# Patient Record
Sex: FEMALE | Race: WHITE
[De-identification: names, ages, dates, MRNs, and addresses within clinical notes are randomized per-mention and may not be internally consistent; named-entity substitution may affect disease eponyms.]

---

## 2018-03-20 ENCOUNTER — HOSPITAL ENCOUNTER (INPATIENT)
Dept: HOSPITAL 17 - NEDDLT | Age: 53
LOS: 7 days | Discharge: HOME HEALTH | DRG: 854 | End: 2018-03-27
Attending: HOSPITALIST | Admitting: HOSPITALIST
Payer: COMMERCIAL

## 2018-03-20 VITALS — BODY MASS INDEX: 41.25 KG/M2 | WEIGHT: 218.48 LBS | HEIGHT: 61 IN

## 2018-03-20 DIAGNOSIS — M00.9: ICD-10-CM

## 2018-03-20 DIAGNOSIS — S61.251A: ICD-10-CM

## 2018-03-20 DIAGNOSIS — S61.452A: ICD-10-CM

## 2018-03-20 DIAGNOSIS — L02.512: ICD-10-CM
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
## 2018-03-20 DIAGNOSIS — Z16.11: ICD-10-CM

## 2018-03-20 DIAGNOSIS — A41.01: Primary | ICD-10-CM

## 2018-03-20 DIAGNOSIS — E11.65: ICD-10-CM

## 2018-03-20 DIAGNOSIS — L08.9: ICD-10-CM

## 2018-03-20 PROCEDURE — 80048 BASIC METABOLIC PNL TOTAL CA: CPT

## 2018-03-20 PROCEDURE — 87070 CULTURE OTHR SPECIMN AEROBIC: CPT

## 2018-03-20 PROCEDURE — 96361 HYDRATE IV INFUSION ADD-ON: CPT

## 2018-03-20 PROCEDURE — 85025 COMPLETE CBC W/AUTO DIFF WBC: CPT

## 2018-03-20 PROCEDURE — 80053 COMPREHEN METABOLIC PANEL: CPT

## 2018-03-20 PROCEDURE — 96375 TX/PRO/DX INJ NEW DRUG ADDON: CPT

## 2018-03-20 PROCEDURE — 93005 ELECTROCARDIOGRAM TRACING: CPT

## 2018-03-20 PROCEDURE — 83036 HEMOGLOBIN GLYCOSYLATED A1C: CPT

## 2018-03-20 PROCEDURE — 82948 REAGENT STRIP/BLOOD GLUCOSE: CPT

## 2018-03-20 PROCEDURE — 87205 SMEAR GRAM STAIN: CPT

## 2018-03-20 PROCEDURE — 87040 BLOOD CULTURE FOR BACTERIA: CPT

## 2018-03-20 PROCEDURE — 90471 IMMUNIZATION ADMIN: CPT

## 2018-03-20 PROCEDURE — 36569 INSJ PICC 5 YR+ W/O IMAGING: CPT

## 2018-03-20 PROCEDURE — 87147 CULTURE TYPE IMMUNOLOGIC: CPT

## 2018-03-20 PROCEDURE — 76937 US GUIDE VASCULAR ACCESS: CPT

## 2018-03-20 PROCEDURE — 83605 ASSAY OF LACTIC ACID: CPT

## 2018-03-20 PROCEDURE — 86403 PARTICLE AGGLUT ANTBDY SCRN: CPT

## 2018-03-20 PROCEDURE — 87206 SMEAR FLUORESCENT/ACID STAI: CPT

## 2018-03-20 PROCEDURE — 87116 MYCOBACTERIA CULTURE: CPT

## 2018-03-20 PROCEDURE — 93971 EXTREMITY STUDY: CPT

## 2018-03-20 PROCEDURE — 73201 CT UPPER EXTREMITY W/DYE: CPT

## 2018-03-20 PROCEDURE — 87186 SC STD MICRODIL/AGAR DIL: CPT

## 2018-03-20 PROCEDURE — 87015 SPECIMEN INFECT AGNT CONCNTJ: CPT

## 2018-03-20 PROCEDURE — 87102 FUNGUS ISOLATION CULTURE: CPT

## 2018-03-20 PROCEDURE — 96365 THER/PROPH/DIAG IV INF INIT: CPT

## 2018-03-20 PROCEDURE — 90714 TD VACC NO PRESV 7 YRS+ IM: CPT

## 2018-03-21 VITALS
HEART RATE: 95 BPM | SYSTOLIC BLOOD PRESSURE: 166 MMHG | OXYGEN SATURATION: 95 % | DIASTOLIC BLOOD PRESSURE: 77 MMHG | TEMPERATURE: 98.8 F | RESPIRATION RATE: 18 BRPM

## 2018-03-21 VITALS
DIASTOLIC BLOOD PRESSURE: 68 MMHG | RESPIRATION RATE: 18 BRPM | OXYGEN SATURATION: 97 % | TEMPERATURE: 100.2 F | HEART RATE: 109 BPM | SYSTOLIC BLOOD PRESSURE: 137 MMHG

## 2018-03-21 VITALS
DIASTOLIC BLOOD PRESSURE: 75 MMHG | SYSTOLIC BLOOD PRESSURE: 131 MMHG | TEMPERATURE: 98.7 F | OXYGEN SATURATION: 96 % | RESPIRATION RATE: 18 BRPM | HEART RATE: 91 BPM

## 2018-03-21 VITALS
SYSTOLIC BLOOD PRESSURE: 132 MMHG | OXYGEN SATURATION: 92 % | RESPIRATION RATE: 18 BRPM | HEART RATE: 92 BPM | TEMPERATURE: 98.3 F | DIASTOLIC BLOOD PRESSURE: 72 MMHG

## 2018-03-21 VITALS
HEART RATE: 87 BPM | RESPIRATION RATE: 20 BRPM | SYSTOLIC BLOOD PRESSURE: 127 MMHG | TEMPERATURE: 98.8 F | OXYGEN SATURATION: 98 % | DIASTOLIC BLOOD PRESSURE: 68 MMHG

## 2018-03-21 PROCEDURE — 0R9V0ZZ DRAINAGE OF LEFT METACARPOPHALANGEAL JOINT, OPEN APPROACH: ICD-10-PCS | Performed by: ORTHOPAEDIC SURGERY

## 2018-03-21 PROCEDURE — 0JBK0ZZ EXCISION OF LEFT HAND SUBCUTANEOUS TISSUE AND FASCIA, OPEN APPROACH: ICD-10-PCS | Performed by: ORTHOPAEDIC SURGERY

## 2018-03-21 RX ADMIN — SODIUM CHLORIDE SCH MLS/HR: 900 INJECTION INTRAVENOUS at 14:17

## 2018-03-21 RX ADMIN — INSULIN ASPART SCH: 100 INJECTION, SOLUTION INTRAVENOUS; SUBCUTANEOUS at 21:00

## 2018-03-21 RX ADMIN — INSULIN ASPART SCH: 100 INJECTION, SOLUTION INTRAVENOUS; SUBCUTANEOUS at 17:08

## 2018-03-21 RX ADMIN — Medication PRN ML: at 15:05

## 2018-03-21 RX ADMIN — MORPHINE SULFATE PRN MG: 2 INJECTION, SOLUTION INTRAMUSCULAR; INTRAVENOUS at 11:01

## 2018-03-21 RX ADMIN — Medication SCH ML: at 21:00

## 2018-03-21 RX ADMIN — Medication PRN ML: at 14:16

## 2018-03-21 RX ADMIN — Medication PRN ML: at 11:01

## 2018-03-21 RX ADMIN — MORPHINE SULFATE PRN MG: 2 INJECTION, SOLUTION INTRAMUSCULAR; INTRAVENOUS at 15:06

## 2018-03-21 RX ADMIN — SODIUM CHLORIDE SCH MLS/HR: 900 INJECTION INTRAVENOUS at 09:43

## 2018-03-21 RX ADMIN — ACYCLOVIR SCH UNITS: 800 TABLET ORAL at 21:00

## 2018-03-21 RX ADMIN — SODIUM CHLORIDE SCH MLS/HR: 900 INJECTION INTRAVENOUS at 03:24

## 2018-03-21 RX ADMIN — SODIUM CHLORIDE SCH MLS/HR: 900 INJECTION INTRAVENOUS at 21:14

## 2018-03-21 RX ADMIN — Medication SCH ML: at 09:43

## 2018-03-21 NOTE — PD.OP
__________________________________________________





Operative Report


Preoperative Diagnosis:  


(1) Dog bite of left hand including fingers with infection


Postoperative Diagnosis:  


(1) septic arthritis metacarpophalangeal joint left index finger


(2) Dog bite of left hand including fingers with infection


Procedure:


incision and drainage left hand abscess


arthrotomy metacarpophalangeal joint left index finger


Anesthesia:


general


Surgeon:


Deshawn Delgadillo


Assistant(s):


kaylene


Operation and Findings:


abscess with involvement of MP joint left index finger











Deshawn Delgadillo MD Mar 21, 2018 21:50

## 2018-03-21 NOTE — MB
cc:

Deshawn Delgadillo MD

****

 

 

DATE:

03/21/2018

 

REASON FOR CONSULTATION:

Left hand infection.

 

HISTORY OF PRESENT ILLNESS:

The patient is a 53-year-old right-hand dominant female who presented 

to the ED with complaints of dog bite to the left hand 3 days ago.  

She has been complaining of worsening pain and swelling over the 

dorsal aspect of the left hand.  The patient was seen at [facility].  

She had opening up of the wound and transferred to [facility].  She 

has been getting IV antibiotics.  Complains of constant throbbing 

pain.  Denies any tingling, numbness.  Complains of mild drainage from

the region.  Denies any other injuries.

 

PAST MEDICAL HISTORY:

Noted.  No significant past medical history.  The patient was found to

have elevated blood sugar of more than 300 last night.

 

PHYSICAL EXAMINATION:

GENERAL:  The patient is alert, oriented x 3.

EXTREMITIES:  Left hand reveals bite marks over the dorsal aspect of 

the hand over the MP joint region of the index finger, another wound 

noted over the radial aspect of the MP joint of the index finger.  

Surrounding swelling and erythema noted.  Tenderness noted over that 

region.  Range of motion of the index finger is limited and painful.  

She has intact distal sensation.  The patient is unable to make a full

fist.  She has intact distal capillary refill.

 

LABORATORY DATA:

Her lab work was reviewed.  She has a white count of 10.4 and shift of

70%.  She had a CT scan of the left upper extremity shows 

evidence of soft tissue swelling of the dorsal aspect of the hand.  No

evidence of drainable collection.

 

ASSESSMENT:

A 53-year-old female with a dog bite and infection to the left hand 
questionable index finger MP joint involvement

 

PLAN:

Will be to take her emergently for incision and drainage of left hand 

infection possible joint drainage.  We will keep the patient n.p.o.  The 
patient is advised to

keep the part elevated.  We will continue with IV antibiotics.

 

 

__________________________________

Deshawn Delgadillo MD SE/MARIAM

D: 03/21/2018, 07:00 PM

T: 03/21/2018, 07:15 PM

Visit #: [id number]

Job #: [id number]

BENNY

## 2018-03-21 NOTE — HHI.HP
__________________________________________________





HPI


Service


[facility]


Primary Care Physician


Adriano Guerrero MD


Admission Diagnosis





Diagnoses:  


Travel History


International Travel<30 Days:  No


Contact w/Intl Traveler <30 Da:  No


Traveled to Known Affected Are:  No


History of Present Illness


53-year-old female with no significant past medical history who presents 

overnight with 3 day history of constant sharp pain in the left and radiating 

to her fingers.  She was bitten by her dog 3 days ago.  She denies any chest 

pain, shortness of breath, lightheadedness, dizziness.





Review of Systems


Except as stated in HPI:  all other systems reviewed are Neg





Past Family Social History


Past Medical History





Patient denies any past medical history


Past Surgical History





Partial hysterectomy


Meniscal surgery on the right knee


Reported Medications


Patient denies taking any home medications


Allergies:  


Coded Allergies:  


     No Known Allergies (Verified  Allergy, Severe, 3/20/18)


Family History





Mother passed away secondary to cervical cancer


Father passed away secondary to MI in his 50s.


Social History


Non-smoker.  Occasional drinker.  Denies illicit drugs.





Physical Exam


Vital Signs





Vital Signs








  Date Time  Temp Pulse Resp B/P (MAP) Pulse Ox O2 Delivery O2 Flow Rate FiO2


 


3/21/18 15:19 98.3 92 18 132/72 (92) 92   


 


3/21/18 11:21 98.8 95 18 166/77 (106) 95   


 


3/21/18 07:09 98.7 91 18 131/75 (93) 96   


 


3/21/18 05:51 98.8 87 20 127/68 (87) 98   


 


3/21/18 01:49 100.2 109 18 137/68 (91) 97   








Physical Exam


GENERAL: This is a well-nourished, well-developed patient, in no apparent 

distress.  Alert and oriented 4.


SKIN: No rashes, ecchymoses or lesions. Cool and dry.


HEAD: Atraumatic. Normocephalic. No temporal or scalp tenderness.


EYES: Pupils equal round and reactive. Extraocular motions intact. No scleral 

icterus. No injection or drainage. 


ENT: Nose without bleeding, purulent drainage or septal hematoma. Throat 

without erythema, tonsillar hypertrophy or exudate. Uvula midline. Airway 

patent.


NECK: Trachea midline. No JVD or lymphadenopathy. Supple, nontender, no 

meningeal signs.


CARDIOVASCULAR: Regular rate and rhythm without murmurs, gallops, or rubs. 


RESPIRATORY: Clear to auscultation. Breath sounds equal bilaterally. No wheezes

, rales, or rhonchi.  


GASTROINTESTINAL: Abdomen soft, non-tender, nondistended. No hepato-splenomegaly

, or palpable masses. No guarding.


MUSCULOSKELETAL: Extremities without clubbing, cyanosis, or edema.  Left hand 

with swelling, induration and erythema on the dorsum with clear drainage from 

puncture lesion posteriorly.  Tender to palpation.  Otherwise no joint 

tenderness, effusion, or edema noted. No calf tenderness. Negative Homans sign 

bilaterally.


NEUROLOGICAL: Awake and alert. Cranial nerves II through XII intact.  Motor and 

sensory grossly within normal limits. Five out of 5 muscle strength in all 

muscle groups.  Normal speech.


Imaging


CT scan from deltoid the ER reviewed.





Caprini VTE Risk Assessment


Caprini VTE Risk Assessment:  No/Low Risk (score <= 1)


Caprini Risk Assessment Model











 Point Value = 1          Point Value = 2  Point Value = 3  Point Value = 5


 


Age 41-60


Minor surgery


BMI > 25 kg/m2


Swollen legs


Varicose veins


Pregnancy or postpartum


History of unexplained or recurrent


   spontaneous 


Oral contraceptives or hormone


   replacement


Sepsis (< 1 month)


Serious lung disease, including


   pneumonia (< 1 month)


Abnormal pulmonary function


Acute myocardial infarction


Congestive heart failure (< 1 month)


History of inflammatory bowel disease


Medical patient at bed rest Age 61-74


Arthroscopic surgery


Major open surgery (> 45 min)


Laparoscopic surgery (> 45 min)


Malignancy


Confined to bed (> 72 hours)


Immobilizing plaster cast


Central venous access Age >= 75


History of VTE


Family history of VTE


Factor V Leiden


Prothrombin [id number]


Lupus anticoagulant


Anticardiolipin antibodies


Elevated serum homocysteine


Heparin-induced thrombocytopenia


Other congenital or acquired


   thrombophilia Stroke (< 1 month)


Elective arthroplasty


Hip, pelvis, or leg fracture


Acute spinal cord injury (< 1 month)








Prophylaxis Regimen











   Total Risk


Factor Score Risk Level Prophylaxis Regimen


 


0-1      Low Early ambulation


 


2 Moderate Order ONE of the following:


*Sequential Compression Device (SCD)


*Heparin 5000 units SQ BID


 


3-4 Higher Order ONE of the following medications:


*Heparin 5000 units SQ TID


*Enoxaparin/Lovenox 40 mg SQ daily (WT < 150 kg, CrCl > 30 mL/min)


*Enoxaparin/Lovenox 30 mg SQ daily (WT < 150 kg, CrCl > 10-29 mL/min)


*Enoxaparin/Lovenox 30 mg SQ BID (WT < 150 kg, CrCl > 30 mL/min)


AND/OR


*Sequential Compression Device (SCD)


 


5 or more Highest Order ONE of the following medications:


*Heparin 5000 units SQ TID (Preferred with Epidurals)


*Enoxaparin/Lovenox 40 mg SQ daily (WT < 150 kg, CrCl > 30 mL/min)


*Enoxaparin/Lovenox 30 mg SQ daily (WT < 150 kg, CrCl > 10-29 mL/min)


*Enoxaparin/Lovenox 30 mg SQ BID (WT < 150 kg, CrCl > 30 mL/min)


AND


*Sequential Compression Device (SCD)











Assessment and Plan


Assessment and Plan





//Dog bite left hand.


//Possible severe sepsis on admission.  Temperature 100.2, tachycardic 109.  

Severe secondary to hyperglycemia


-Blood cultures and wound cultures pending.  CT does not indicate osteomyelitis.


-Continue on Unasyn.


-Hand surgery following.  Appreciate assistance.








//Hyperglycemia.  Blood glucose in 350s, likely secondary to infection.  Check 

A1c.  Start patient on insulin sliding scale.


Discussed Condition With


Patient, nurse





Physician Certification


2 Midnight Certification Type:  Admission for Inpatient Services


Order for Inpatient Services


The services are ordered in accordance with Medicare regulations or non-

Medicare payer requirements, as applicable.  In the case of services not 

specified as inpatient-only, they are appropriately provided as inpatient 

services in accordance with the 2-midnight benchmark.


Estimated LOS (days):  2


 days is the estimated time the patient will need to remain in the hospital, 

assuming treatment plan goals are met and no additional complications.


Post-Hospital Plan:  Not yet determined











Luis Lizama MD Mar 21, 2018 16:11

## 2018-03-21 NOTE — EKG
Date Performed: 03/21/2018       Time Performed: 16:28:50

 

PTAGE:      53 years

 

EKG:      Sinus rhythm 

 

 NORMAL ECG 

 

NO PREVIOUS TRACING            

 

DOCTOR:   Isaak Up  Interpretating Date/Time  03/21/2018 19:20:56

## 2018-03-22 VITALS
OXYGEN SATURATION: 93 % | SYSTOLIC BLOOD PRESSURE: 133 MMHG | RESPIRATION RATE: 18 BRPM | TEMPERATURE: 98.3 F | DIASTOLIC BLOOD PRESSURE: 73 MMHG | HEART RATE: 93 BPM

## 2018-03-22 VITALS
HEART RATE: 94 BPM | OXYGEN SATURATION: 94 % | RESPIRATION RATE: 17 BRPM | TEMPERATURE: 98.4 F | SYSTOLIC BLOOD PRESSURE: 129 MMHG | DIASTOLIC BLOOD PRESSURE: 63 MMHG

## 2018-03-22 VITALS
TEMPERATURE: 98.1 F | RESPIRATION RATE: 19 BRPM | SYSTOLIC BLOOD PRESSURE: 139 MMHG | OXYGEN SATURATION: 94 % | DIASTOLIC BLOOD PRESSURE: 78 MMHG | HEART RATE: 82 BPM

## 2018-03-22 LAB
BASOPHILS # BLD AUTO: 0 TH/MM3 (ref 0–0.2)
BASOPHILS NFR BLD: 0.2 % (ref 0–2)
BUN SERPL-MCNC: 13 MG/DL (ref 7–18)
CALCIUM SERPL-MCNC: 8.9 MG/DL (ref 8.5–10.1)
CHLORIDE SERPL-SCNC: 101 MEQ/L (ref 98–107)
CREAT SERPL-MCNC: 0.76 MG/DL (ref 0.5–1)
EOSINOPHIL # BLD: 0 TH/MM3 (ref 0–0.4)
EOSINOPHIL NFR BLD: 0 % (ref 0–4)
ERYTHROCYTE [DISTWIDTH] IN BLOOD BY AUTOMATED COUNT: 12.3 % (ref 11.6–17.2)
GFR SERPLBLD BASED ON 1.73 SQ M-ARVRAT: 79 ML/MIN (ref 89–?)
GLUCOSE SERPL-MCNC: 349 MG/DL (ref 74–106)
HBA1C MFR BLD: 9.7 % (ref 4.3–6)
HCO3 BLD-SCNC: 25.6 MEQ/L (ref 21–32)
HCT VFR BLD CALC: 38.4 % (ref 35–46)
HGB BLD-MCNC: 13.4 GM/DL (ref 11.6–15.3)
LYMPHOCYTES # BLD AUTO: 0.7 TH/MM3 (ref 1–4.8)
LYMPHOCYTES NFR BLD AUTO: 9 % (ref 9–44)
MCH RBC QN AUTO: 30.5 PG (ref 27–34)
MCHC RBC AUTO-ENTMCNC: 34.7 % (ref 32–36)
MCV RBC AUTO: 87.7 FL (ref 80–100)
MONOCYTE #: 0.2 TH/MM3 (ref 0–0.9)
MONOCYTES NFR BLD: 2.5 % (ref 0–8)
NEUTROPHILS # BLD AUTO: 6.9 TH/MM3 (ref 1.8–7.7)
NEUTROPHILS NFR BLD AUTO: 88.3 % (ref 16–70)
PLATELET # BLD: 195 TH/MM3 (ref 150–450)
PMV BLD AUTO: 8.7 FL (ref 7–11)
RBC # BLD AUTO: 4.38 MIL/MM3 (ref 4–5.3)
SODIUM SERPL-SCNC: 137 MEQ/L (ref 136–145)
WBC # BLD AUTO: 7.8 TH/MM3 (ref 4–11)

## 2018-03-22 RX ADMIN — ACYCLOVIR SCH UNITS: 800 TABLET ORAL at 22:03

## 2018-03-22 RX ADMIN — INSULIN ASPART SCH: 100 INJECTION, SOLUTION INTRAVENOUS; SUBCUTANEOUS at 22:09

## 2018-03-22 RX ADMIN — INSULIN ASPART SCH: 100 INJECTION, SOLUTION INTRAVENOUS; SUBCUTANEOUS at 06:49

## 2018-03-22 RX ADMIN — SODIUM CHLORIDE SCH MLS/HR: 900 INJECTION INTRAVENOUS at 18:36

## 2018-03-22 RX ADMIN — INSULIN ASPART SCH: 100 INJECTION, SOLUTION INTRAVENOUS; SUBCUTANEOUS at 18:17

## 2018-03-22 RX ADMIN — INSULIN ASPART SCH: 100 INJECTION, SOLUTION INTRAVENOUS; SUBCUTANEOUS at 12:00

## 2018-03-22 RX ADMIN — MORPHINE SULFATE PRN MG: 2 INJECTION, SOLUTION INTRAMUSCULAR; INTRAVENOUS at 18:10

## 2018-03-22 RX ADMIN — SODIUM CHLORIDE SCH MLS/HR: 900 INJECTION INTRAVENOUS at 22:01

## 2018-03-22 RX ADMIN — SODIUM CHLORIDE SCH MLS/HR: 900 INJECTION INTRAVENOUS at 03:00

## 2018-03-22 RX ADMIN — SODIUM CHLORIDE SCH MLS/HR: 900 INJECTION INTRAVENOUS at 14:42

## 2018-03-22 RX ADMIN — SODIUM CHLORIDE SCH MLS/HR: 900 INJECTION INTRAVENOUS at 08:55

## 2018-03-22 RX ADMIN — Medication SCH ML: at 09:00

## 2018-03-22 RX ADMIN — Medication SCH ML: at 22:09

## 2018-03-22 RX ADMIN — ACYCLOVIR SCH UNITS: 800 TABLET ORAL at 09:00

## 2018-03-22 NOTE — MP
cc:

Deshawn Delgadillo MD

****

 

 

DATE OF OPERATION:

03/21/2018

 

PREOPERATIVE DIAGNOSIS:

Dog bite with abscess, left hand/index finger.

 

POSTOPERATIVE DIAGNOSIS:

Dog bite with abscess, left hand/index finger and septic arthritis 

metacarpophalangeal joint, left index finger.

 

PROCEDURE PERFORMED:

Incision and drainage of left hand abscess and arthrotomy and drainage

index finger metacarpophalangeal joint, left hand.

 

SURGEON:

Deshawn Delgadillo MD

 

ANESTHESIA:

General.

 

ESTIMATED BLOOD LOSS:

5 mL

 

TOURNIQUET TIME:

17 minutes at 250 mmHg.

 

SPECIMENS:

Sent for culture and sensitivity from the left index finger MP joint. 

 

DISPOSITION:

To PACU stable.

 

INDICATIONS FOR PROCEDURE:

The patient is a 53-year-old female who presented with complaints of 

dog bite to the left hand, 3 days ago.  She complained of pain over 

the left hand and index finger.  On examination, she had pain and 

swelling over the dorsal aspect of the hand, with purulent drainage 

from index finger MP joint region.  She had elevated white count.  

X-ray showed soft tissue shadow.  CT scan showed no evidence of 

collection.  She was consented for incision and drainage of left hand 

abscess.  The patient was explained the risks and benefits of the 

procedure.

 

DETAILS OF PROCEDURE:

The patient was brought to the operating room under general 

anesthesia.  The left upper extremity was sterilely prepped and 

draped.  After limb elevation, tourniquet was inflated to 250 mmHg.  

Incision site was marked over the dorsoulnar aspect of the MP joint 

region of the index finger, corresponding to the bite scotty.  An 

incision was made over the proposed incision site after limb elevation

and tourniquet inflation.  Soft tissue dissection was carried out.  

There was extensive inflammatory tissue on the abscess over the dorsal

aspect of the index finger MP joint region, involving the subcutaneous

location.  On further exploration, there was evidence of purulent 

material draining from the MP joint of the index finger.  Hence, a 

decision was made to proceed with arthrotomy of the index finger MP 

joint.  An incision was made in the longitudinal fashion along the 

ulnar aspect of the MP joint.  On further exploration, there was 

evidence of purulent material within the MP joint.  Material was sent 

for culture and sensitivity.  A piece of capsule was excised and sent 

for culture and sensitivity.  Thorough wash was given using normal 

saline mixed with  irrigant and hydrogen peroxide.  Excisional 

debridement of devitalized tissue was carried out.  The edges of the 

skin was excised, corresponding to the bite.  Another bite scotty, over 

the volar radial aspect of the index finger was explored.  No evidence

of purulent material was noted in the region.  Packing of the MP joint

was carried out.  The skin was then loosely approximated using 4-0 

nylon in a horizontal mattress interrupted fashion.  Tourniquet was 

deflated.  Total tourniquet time was 70 minutes.  The patient had good

distal circulation of at the end of the procedure.  Bulky hand dressing was 
applied, which 

was held in place by bias hand wrap.

 

We will continue with IV antibiotics.  Followup cultures.  We will 

obtain ID recommendation for IV antibiotics and Hand Surgery followup.

 

 

__________________________________

Deshawn Delgadillo MD SE/CHELA

D: 03/21/2018, 09:54 PM

T: 03/21/2018, 10:19 PM

Visit #: [id number]

Job #: [id number]

BENNY

## 2018-03-22 NOTE — HHI.PR
Objective





Vital Signs








  Date Time  Temp Pulse Resp B/P (MAP) Pulse Ox O2 Delivery O2 Flow Rate FiO2


 


3/22/18 12:00 98.1 82 19 139/78 (98) 94   


 


3/22/18 12:00  86 18 118/64 (82) 94 Room Air  


 


3/22/18 08:20   18  95 Room Air  


 


3/22/18 08:00 97.9 72 18 146/70 (95) 95   


 


3/22/18 04:00 98.1 88 16 130/65 (86) 96 Nasal Cannula 3 


 


3/22/18 00:00  138 18 138/67 (90) 95 Nasal Cannula 3 


 


3/21/18 23:00  97 18 140/69 (92) 94 Nasal Cannula 3 


 


3/21/18 22:45  97 18 135/66 (89) 94 Nasal Cannula 3 


 


3/21/18 22:30  100 20 147/72 (97) 94 Nasal Cannula 3 


 


3/21/18 22:15  98 20 145/67 (93) 94 Nasal Cannula 3 


 


3/21/18 22:00  101 22 146/69 (94) 94 Nasal Cannula 3 


 


3/21/18 21:50 98.9 104 20 104/20 (48) 97 Nasal Cannula 3 














I/O      


 


 3/21/18 3/21/18 3/21/18 3/22/18 3/22/18 3/22/18





 06:59 14:59 22:59 06:59 14:59 22:59


 


Intake Total  200 ml 500 ml  600 ml 


 


Output Total   5 ml  800 ml 


 


Balance  200 ml 495 ml  -200 ml 


 


      


 


Intake Oral     600 ml 


 


IV Total  200 ml    


 


Other   500 ml   


 


Output Urine Total     800 ml 


 


Estimated Blood Loss   5 ml   


 


# Voids  2 2   








Result Diagram:  


3/22/18 0533                                                                   

             3/22/18 0533





Objective Remarks


GENERAL: sitting up in bed.  AAOx3


SKIN: Warm and dry.


HEAD: Normocephalic.


EYES: No scleral icterus. No injection or drainage. 


NECK: Supple, trachea midline. No JVD.


CARDIOVASCULAR: Regular rate and rhythm without murmurs, gallops, or rubs. 


RESPIRATORY: Breath sounds equal bilaterally. No accessory muscle use.


GASTROINTESTINAL: Abdomen soft, non-tender, nondistended. 


MUSCULOSKELETAL: No cyanosis, or edema. lef hand dressed, elevated.


BACK: Nontender without obvious deformity. No CVA tenderness.








A/P


Assessment and Plan


//Dog bite left hand.


//Possible severe sepsis on admission.  Temperature 100.2, tachycardic 109.  

Severe secondary to hyperglycemia


-Blood cultures and wound cultures pending.  CT does not indicate osteomyelitis.


-Continue on Unasyn.


-Hand surgery following.  Appreciate assistance.


=3/22 2/p surgical debridement.  GPC WC. start VANC.  consult ID as per hand 

surgery








//Hyperglycemia.  Blood glucose in 350s, likely secondary to infection.  Check 

A1c.  Start patient on insulin sliding scale.


Discharge Planning


cont IV ABX for hand infection











Luis Lizama MD Mar 22, 2018 17:00

## 2018-03-22 NOTE — HHI.PR
Subjective


Remarks


complains of mild pain


Lot better compared to prior to surgery


complaint with limb elevation


denies any numbness


no fever





Objective





Vital Signs








  Date Time  Temp Pulse Resp B/P (MAP) Pulse Ox O2 Delivery O2 Flow Rate FiO2


 


3/22/18 12:00  86 18 118/64 (82) 94 Room Air  


 


3/22/18 08:20   18  95 Room Air  


 


3/22/18 08:00 97.9 72 18 146/70 (95) 95   


 


3/22/18 04:00 98.1 88 16 130/65 (86) 96 Nasal Cannula 3 


 


3/22/18 00:00  138 18 138/67 (90) 95 Nasal Cannula 3 


 


3/21/18 23:00  97 18 140/69 (92) 94 Nasal Cannula 3 


 


3/21/18 22:45  97 18 135/66 (89) 94 Nasal Cannula 3 


 


3/21/18 22:30  100 20 147/72 (97) 94 Nasal Cannula 3 


 


3/21/18 22:15  98 20 145/67 (93) 94 Nasal Cannula 3 


 


3/21/18 22:00  101 22 146/69 (94) 94 Nasal Cannula 3 


 


3/21/18 21:50 98.9 104 20 104/20 (48) 97 Nasal Cannula 3 


 


3/21/18 15:19 98.3 92 18 132/72 (92) 92   














I/O      


 


 3/21/18 3/21/18 3/21/18 3/22/18 3/22/18 3/22/18





 07:00 15:00 23:00 07:00 15:00 23:00


 


Intake Total  200 ml 500 ml  600 ml 


 


Output Total   5 ml  800 ml 


 


Balance  200 ml 495 ml  -200 ml 


 


      


 


Intake Oral     600 ml 


 


IV Total  200 ml    


 


Other   500 ml   


 


Output Urine Total     800 ml 


 


Estimated Blood Loss   5 ml   


 


# Voids  2 2   














left hand:


 dressing and packing in place


decreased swelling


no drainage


Index finger MP joint range of motion is painful and limited


intact sensation distally





white count normalized


Result Diagram:  


3/22/18 0533                                                                   

             3/22/18 0533








Assessment and Plan


Assessment and Plan


53 year old female s/p I and D, arthrotomy Index finger MP joint POD 1





Plan:





packing pulled out a cm


dry dressing applied


continue with limb elevation and range of motion exercises


continue IV antibiotics


ID consultation for septic arthritis antibiotics.


hand surgery will follow.











Deshawn Delgadillo MD Mar 22, 2018 15:02

## 2018-03-23 VITALS
HEART RATE: 77 BPM | RESPIRATION RATE: 18 BRPM | DIASTOLIC BLOOD PRESSURE: 62 MMHG | OXYGEN SATURATION: 95 % | SYSTOLIC BLOOD PRESSURE: 142 MMHG | TEMPERATURE: 98.1 F

## 2018-03-23 VITALS
RESPIRATION RATE: 18 BRPM | TEMPERATURE: 98.4 F | OXYGEN SATURATION: 95 % | HEART RATE: 76 BPM | SYSTOLIC BLOOD PRESSURE: 129 MMHG | DIASTOLIC BLOOD PRESSURE: 66 MMHG

## 2018-03-23 VITALS
TEMPERATURE: 98.8 F | HEART RATE: 82 BPM | RESPIRATION RATE: 18 BRPM | DIASTOLIC BLOOD PRESSURE: 72 MMHG | OXYGEN SATURATION: 95 % | SYSTOLIC BLOOD PRESSURE: 141 MMHG

## 2018-03-23 VITALS
SYSTOLIC BLOOD PRESSURE: 150 MMHG | TEMPERATURE: 98.1 F | RESPIRATION RATE: 17 BRPM | DIASTOLIC BLOOD PRESSURE: 62 MMHG | HEART RATE: 77 BPM | OXYGEN SATURATION: 97 %

## 2018-03-23 VITALS
RESPIRATION RATE: 18 BRPM | SYSTOLIC BLOOD PRESSURE: 146 MMHG | HEART RATE: 84 BPM | TEMPERATURE: 99 F | DIASTOLIC BLOOD PRESSURE: 84 MMHG | OXYGEN SATURATION: 96 %

## 2018-03-23 VITALS
OXYGEN SATURATION: 95 % | HEART RATE: 84 BPM | DIASTOLIC BLOOD PRESSURE: 58 MMHG | SYSTOLIC BLOOD PRESSURE: 123 MMHG | TEMPERATURE: 98.3 F | RESPIRATION RATE: 17 BRPM

## 2018-03-23 RX ADMIN — SODIUM CHLORIDE SCH MLS/HR: 900 INJECTION INTRAVENOUS at 19:56

## 2018-03-23 RX ADMIN — SODIUM CHLORIDE SCH MLS/HR: 900 INJECTION INTRAVENOUS at 05:42

## 2018-03-23 RX ADMIN — Medication SCH ML: at 19:57

## 2018-03-23 RX ADMIN — SODIUM CHLORIDE SCH MLS/HR: 900 INJECTION INTRAVENOUS at 09:23

## 2018-03-23 RX ADMIN — INSULIN ASPART SCH: 100 INJECTION, SOLUTION INTRAVENOUS; SUBCUTANEOUS at 18:04

## 2018-03-23 RX ADMIN — SODIUM CHLORIDE SCH MLS/HR: 900 INJECTION INTRAVENOUS at 03:57

## 2018-03-23 RX ADMIN — INSULIN ASPART SCH: 100 INJECTION, SOLUTION INTRAVENOUS; SUBCUTANEOUS at 09:25

## 2018-03-23 RX ADMIN — INSULIN ASPART SCH: 100 INJECTION, SOLUTION INTRAVENOUS; SUBCUTANEOUS at 12:00

## 2018-03-23 RX ADMIN — ACYCLOVIR SCH UNITS: 800 TABLET ORAL at 20:53

## 2018-03-23 RX ADMIN — MORPHINE SULFATE PRN MG: 2 INJECTION, SOLUTION INTRAMUSCULAR; INTRAVENOUS at 19:57

## 2018-03-23 RX ADMIN — INSULIN ASPART SCH: 100 INJECTION, SOLUTION INTRAVENOUS; SUBCUTANEOUS at 20:53

## 2018-03-23 RX ADMIN — ACYCLOVIR SCH UNITS: 800 TABLET ORAL at 09:28

## 2018-03-23 RX ADMIN — SODIUM CHLORIDE SCH MLS/HR: 900 INJECTION INTRAVENOUS at 17:50

## 2018-03-23 RX ADMIN — Medication SCH ML: at 09:00

## 2018-03-23 NOTE — PD.ID.CON
History of Present Illness


Service


ID


Consult Requested By


Dr Lizama


Reason for Consult


L hand dog bite


Primary Care Physician


Adriano Guerrero MD


Diagnoses:  


History of Present Illness


52 yo female presented 2 days post her dog has bitten her into L hand


She presented to [place] [facility] with worsening swelling redness and pain and was 

seen by Dr ESCOBAR


She was taken to OR and had Incision and drainage of left hand abscess and 

arthrotomy and drainage index finger metacarpophalangeal joint, left hand yday


Clx is growing MSSA


Pain, swerlling improved 


On presentation fever, chills, temp up to 100.2, no leukocytosis


Today fever resolved


 Also newly diagnosed DM on this admission


Dog is the pt's own dog, 8 y.o. yorkie, up to date on shots





Review of Systems


Except as stated in HPI:  all other systems reviewed are Neg





Past Family Social History


Allergies:  


Coded Allergies:  


     No Known Allergies (Verified  Allergy, Severe, 3/20/18)


Past Medical History


none PTA


Past Surgical History


Partial hysterectomy


Meniscal surgery on the right knee


Active Ordered Medications


Medications where reviewed in EMR: zosyn, vanco


Family History





Mother passed away secondary to cervical cancer


Father passed away secondary to MI in his 50s.


Social History


 


Non-smoker.  Occasional drinker.  Denies illicit drugs.





Physical Exam


Vital Signs





Vital Signs








  Date Time  Temp Pulse Resp B/P (MAP) Pulse Ox O2 Delivery O2 Flow Rate FiO2


 


3/23/18 16:00 99.0 84 18 146/84 (104) 96   


 


3/23/18 12:00 98.4 76 18 129/66 (87) 95   


 


3/23/18 08:00 98.1 77 18 142/62 (88) 95   


 


3/23/18 04:47 98.1 77 17 150/62 (91) 97   


 


3/23/18 00:30 98.3 84 17 123/58 (79) 95   


 


3/22/18 20:11 98.4 94 17 129/63 (85) 94   








Physical Exam


CONSTITUTIONAL/GENERAL: This is an adequately nourished patient, in no apparent 

distress.


TUBES/LINES/DRAINS:


SKIN: No jaundice, rashes, or lesions.


Skin temperature appropriate. Not diaphoretic. 


HEAD: Atraumatic. Normocephalic.


EYES: Pupils equal and round and reactive. Extraocular motions intact. No 

scleral icterus. No injection or drainage. Fundi not examined.


ENT: Hearing grossly normal. Nose without bleeding or purulent drainage. Throat 

without visible erythema, exudates, masses, or lesions.


NECK: Trachea midline. Supple, nontender. No palpable thyroid enlargement or 

nodularity. 


CARDIOVASCULAR: Regular rate and rhythm without murmurs, gallops, or rubs. No 

JVD. Peripheral pulses symmetric.


RESPIRATORY/CHEST: Symmetric, unlabored respirations. Clear to auscultation. 

Breath sounds equal bilaterally. No wheezes, rales, or rhonchi.  


GASTROINTESTINAL: Abdomen soft, non-tender, nondistended. No hepato-splenomegaly

, or palpable masses. No guarding. Bowel sounds present.


 MUSCULOSKELETAL: Extremities without clubbing, cyanosis, or edema. No joint 

tenderness or effusion noted. No calf tenderness. No mottling or clubbing.


STATUS LOCALIS:


L hand with residual  small amount of barely noticible erythema  + wrinkling cw 

diminishing edema, erythema


Incision over 1 St MCP joint, packed with small amount of purulence upon 

packing removal


minimal surraondinfg erythema, mils edema 


LYMPHATICS: No palpable epithroclear and axillae adenopathy.


NEUROLOGICAL: Awake and alert. Motor and sensory grossly within normal limits. 

Follows commands. Cognitively sharp. Moves all extremities.


PSYCHIATRIC: No obvious anxiety/depression. no apparent hallucinations or other 

psychotic thought process.


Laboratory











 Date/Time


Source Procedure


Growth Status


 


 


 3/22/18 00:04


Wound Finger Fungal Smear - Final


NO FUNGAL ELEMENTS SEEN. Resulted


 


 3/22/18 00:04


Wound Finger Fungal Culture


Pending Resulted








Result Diagram:  


3/22/18 0533                                                                   

             3/22/18 0533








Assessment and Plan


Assessment and Plan


Sp dog bite with abscess, left hand/index finger and septic arthritis 

metacarpophalangeal joint, left index finger, MSSA


 sp I+D





cont Unasyn for now 


fu cl untill final


Discussed Condition With


Dr Delgadillo @ b/s











Faiza Gómez MD Mar 23, 2018 17:57

## 2018-03-23 NOTE — HHI.PR
Subjective


Remarks


complains of mild pain


complaint with limb elevation


denies any numbness


no fever





Objective





Vital Signs








  Date Time  Temp Pulse Resp B/P (MAP) Pulse Ox O2 Delivery O2 Flow Rate FiO2


 


3/23/18 16:00 99.0 84 18 146/84 (104) 96   


 


3/23/18 12:00 98.4 76 18 129/66 (87) 95   


 


3/23/18 08:00 98.1 77 18 142/62 (88) 95   


 


3/23/18 04:47 98.1 77 17 150/62 (91) 97   


 


3/23/18 00:30 98.3 84 17 123/58 (79) 95   


 


3/22/18 20:11 98.4 94 17 129/63 (85) 94   














I/O      


 


 3/22/18 3/22/18 3/22/18 3/23/18 3/23/18 3/23/18





 07:00 15:00 23:00 07:00 15:00 23:00


 


Intake Total  600 ml  932 ml  


 


Output Total  800 ml    


 


Balance  -200 ml  932 ml  


 


      


 


Intake Oral  600 ml  480 ml  


 


IV Total    452 ml  


 


Output Urine Total  800 ml    


 


# Voids    3  











examination of the left hand;





dressing and packing in place


mild swelling and erythema noted


minimal drainage


range of motion of the index finger is limited and painful


intact sensation distally





cultures: staph


Result Diagram:  


3/22/18 0533                                                                   

             3/22/18 0533








Assessment and Plan


Assessment and Plan


53 year old female s/p I and D, arthrotomy Index finger MP joint POD 2





Plan:





packing pulled out.


dry dressing applied


continue with limb elevation and range of motion exercises


continue IV antibiotics


ID consultation for septic arthritis antibiotics.


hand surgery will follow.











Deshawn Delgadillo MD Mar 23, 2018 19:14

## 2018-03-24 VITALS
TEMPERATURE: 98.8 F | DIASTOLIC BLOOD PRESSURE: 74 MMHG | RESPIRATION RATE: 17 BRPM | SYSTOLIC BLOOD PRESSURE: 164 MMHG | HEART RATE: 85 BPM | OXYGEN SATURATION: 95 %

## 2018-03-24 VITALS
RESPIRATION RATE: 18 BRPM | TEMPERATURE: 98.2 F | HEART RATE: 75 BPM | SYSTOLIC BLOOD PRESSURE: 142 MMHG | DIASTOLIC BLOOD PRESSURE: 72 MMHG | OXYGEN SATURATION: 93 %

## 2018-03-24 VITALS
DIASTOLIC BLOOD PRESSURE: 78 MMHG | HEART RATE: 76 BPM | TEMPERATURE: 98.5 F | SYSTOLIC BLOOD PRESSURE: 143 MMHG | OXYGEN SATURATION: 93 % | RESPIRATION RATE: 18 BRPM

## 2018-03-24 VITALS
OXYGEN SATURATION: 97 % | TEMPERATURE: 98.2 F | SYSTOLIC BLOOD PRESSURE: 148 MMHG | HEART RATE: 74 BPM | RESPIRATION RATE: 18 BRPM | DIASTOLIC BLOOD PRESSURE: 80 MMHG

## 2018-03-24 VITALS
OXYGEN SATURATION: 93 % | SYSTOLIC BLOOD PRESSURE: 140 MMHG | RESPIRATION RATE: 18 BRPM | DIASTOLIC BLOOD PRESSURE: 65 MMHG | HEART RATE: 84 BPM | TEMPERATURE: 99.2 F

## 2018-03-24 VITALS
SYSTOLIC BLOOD PRESSURE: 141 MMHG | DIASTOLIC BLOOD PRESSURE: 70 MMHG | OXYGEN SATURATION: 97 % | HEART RATE: 75 BPM | RESPIRATION RATE: 18 BRPM | TEMPERATURE: 98.5 F

## 2018-03-24 RX ADMIN — SODIUM CHLORIDE SCH MLS/HR: 900 INJECTION INTRAVENOUS at 15:38

## 2018-03-24 RX ADMIN — INSULIN ASPART SCH: 100 INJECTION, SOLUTION INTRAVENOUS; SUBCUTANEOUS at 13:06

## 2018-03-24 RX ADMIN — INSULIN ASPART SCH: 100 INJECTION, SOLUTION INTRAVENOUS; SUBCUTANEOUS at 17:55

## 2018-03-24 RX ADMIN — INSULIN ASPART SCH: 100 INJECTION, SOLUTION INTRAVENOUS; SUBCUTANEOUS at 09:26

## 2018-03-24 RX ADMIN — SODIUM CHLORIDE SCH MLS/HR: 900 INJECTION INTRAVENOUS at 09:27

## 2018-03-24 RX ADMIN — INSULIN ASPART SCH: 100 INJECTION, SOLUTION INTRAVENOUS; SUBCUTANEOUS at 21:00

## 2018-03-24 RX ADMIN — SODIUM CHLORIDE SCH MLS/HR: 900 INJECTION INTRAVENOUS at 22:27

## 2018-03-24 RX ADMIN — Medication SCH ML: at 22:27

## 2018-03-24 RX ADMIN — ACYCLOVIR SCH UNITS: 800 TABLET ORAL at 09:26

## 2018-03-24 RX ADMIN — Medication SCH ML: at 09:28

## 2018-03-24 RX ADMIN — ACYCLOVIR SCH UNITS: 800 TABLET ORAL at 21:00

## 2018-03-24 RX ADMIN — SODIUM CHLORIDE SCH MLS/HR: 900 INJECTION INTRAVENOUS at 03:28

## 2018-03-24 NOTE — HHI.PR
Subjective


Remarks


complains of mild pain


complains of stiffness


complaint with limb elevation


denies any numbness


no fever





Objective





Vital Signs








  Date Time  Temp Pulse Resp B/P (MAP) Pulse Ox O2 Delivery O2 Flow Rate FiO2


 


3/24/18 16:00 98.2 74 18 148/80 (102) 97   


 


3/24/18 12:00 98.5 76 18 143/78 (99) 93   


 


3/24/18 08:00 98.2 75 18 142/72 (95) 93   


 


3/24/18 04:00 98.5 75 18 141/70 (93) 97   


 


3/24/18 00:00 99.2 84 18 140/65 (90) 93   


 


3/23/18 21:30 98.8 82 18 141/72 (95) 95   














I/O      


 


 3/23/18 3/23/18 3/23/18 3/24/18 3/24/18 3/24/18





 07:00 15:00 23:00 07:00 15:00 23:00


 


Intake Total 932 ml  340 ml 100 ml  


 


Balance 932 ml  340 ml 100 ml  


 


      


 


Intake Oral 480 ml  240 ml   


 


IV Total 452 ml  100 ml 100 ml  


 


# Voids 3  2 1  


 


# Bowel Movements   1 0  











examination of the left hand:





dressing in place


decreased swelling and erythema


minimal drainage


painful and limited range of motion of the index finger at the MP joint





cultures: MSSA


Result Diagram:  


3/22/18 0533                                                                   

             3/22/18 0533








Assessment and Plan


Assessment and Plan


53 year old female s/p I and D, arthrotomy Index finger MP joint POD 3





Plan:








dry dressing applied


continue with limb elevation and range of motion exercises


continue IV antibiotics based on ID recommendations


hand surgery will follow.











Deshawn Delgadillo MD Mar 24, 2018 18:16

## 2018-03-24 NOTE — HHI.PR
Subjective


Remarks


Follow up for dog bite wound, septic arthritis.  Patient is currently doing 

well.  No fever or chills.  She is able to move her fingers more.  Still has 

some pain on movement of her fingers.





Objective


Vitals





Vital Signs








  Date Time  Temp Pulse Resp B/P (MAP) Pulse Ox O2 Delivery O2 Flow Rate FiO2


 


3/24/18 12:00 98.5 76 18 143/78 (99) 93   


 


3/24/18 08:00 98.2 75 18 142/72 (95) 93   


 


3/24/18 04:00 98.5 75 18 141/70 (93) 97   


 


3/24/18 00:00 99.2 84 18 140/65 (90) 93   


 


3/23/18 21:30 98.8 82 18 141/72 (95) 95   














I/O      


 


 3/23/18 3/23/18 3/23/18 3/24/18 3/24/18 3/24/18





 07:00 15:00 23:00 07:00 15:00 23:00


 


Intake Total 932 ml  340 ml 100 ml  


 


Balance 932 ml  340 ml 100 ml  


 


      


 


Intake Oral 480 ml  240 ml   


 


IV Total 452 ml  100 ml 100 ml  


 


# Voids 3  2 1  


 


# Bowel Movements   1 0  








Result Diagram:  


3/22/18 0533                                                                   

             3/22/18 0533





Objective Remarks


GENERAL: AOX3, NAD. 


SKIN: Warm and dry.


HEAD: Normocephalic.


EYES: No scleral icterus. No injection or drainage. 


NECK: Supple, trachea midline. No JVD or lymphadenopathy.


CARDIOVASCULAR: Regular rate and rhythm without murmurs, gallops, or rubs. 


RESPIRATORY: Breath sounds equal bilaterally. No accessory muscle use.


GASTROINTESTINAL: Abdomen soft, non-tender, nondistended. 


MUSCULOSKELETAL: No cyanosis, or edema. Left arm tied to pole to keep elevated. 

Able to move fingers. 


BACK: Nontender without obvious deformity. No CVA tenderness.





Procedures


3/21/2018


incision and drainage left hand abscess


arthrotomy metacarpophalangeal joint left index finger





A/P


Problem List:  


(1) Dog bite of left hand including fingers with infection


ICD Code:  S61.452A - Open bite of left hand, initial encounter; S61.259A - 

Open bite of unspecified finger without damage to nail, initial encounter; 

L08.9 - Local infection of the skin and subcutaneous tissue, unspecified; 

W54.0XXA - Bitten by dog, initial encounter


(2) Diabetes mellitus


ICD Code:  E11.9 - Type 2 diabetes mellitus without complications


Assessment and Plan


Ms. Woo is a 53 year old female who was admitted due to left hand pain, 

swelling following a dog bite. Her dog is vaccinated. 





- Dog bite wound of left hand


- Left hand abscess


   - Wound cx grew MSSA resistant to PCN. Will d/c Vancomycin. 


   - Currently on Unasyn. We can likely discharge patient on Augmentin or 

consider Moxifloxacin monotherapy. 


   - Infectious disease evaluated patient and continued Unasyn





- Diabetes mellitus


   - HbA1c 9.7. Currently on Levemir 5 units Q12, Sliding scale insulin. 


   - Will initiate Metformin and Glimepiride upon discharge. Follow up HbA1C in 

3 month. 


   - Will also refer patient to [facility]. 





Full code. SCDs, ambulation.











Rosetta Gardner DO Mar 24, 2018 4:25 pm

## 2018-03-25 VITALS
TEMPERATURE: 97.6 F | OXYGEN SATURATION: 96 % | DIASTOLIC BLOOD PRESSURE: 67 MMHG | HEART RATE: 88 BPM | SYSTOLIC BLOOD PRESSURE: 150 MMHG | RESPIRATION RATE: 19 BRPM

## 2018-03-25 VITALS
OXYGEN SATURATION: 96 % | RESPIRATION RATE: 18 BRPM | SYSTOLIC BLOOD PRESSURE: 136 MMHG | TEMPERATURE: 97.9 F | HEART RATE: 84 BPM | DIASTOLIC BLOOD PRESSURE: 73 MMHG

## 2018-03-25 VITALS
OXYGEN SATURATION: 96 % | TEMPERATURE: 98.5 F | DIASTOLIC BLOOD PRESSURE: 86 MMHG | SYSTOLIC BLOOD PRESSURE: 111 MMHG | HEART RATE: 107 BPM | RESPIRATION RATE: 18 BRPM

## 2018-03-25 VITALS
RESPIRATION RATE: 18 BRPM | TEMPERATURE: 98 F | DIASTOLIC BLOOD PRESSURE: 60 MMHG | HEART RATE: 72 BPM | OXYGEN SATURATION: 95 % | SYSTOLIC BLOOD PRESSURE: 127 MMHG

## 2018-03-25 VITALS
OXYGEN SATURATION: 94 % | DIASTOLIC BLOOD PRESSURE: 79 MMHG | RESPIRATION RATE: 17 BRPM | TEMPERATURE: 98 F | SYSTOLIC BLOOD PRESSURE: 142 MMHG | HEART RATE: 73 BPM

## 2018-03-25 RX ADMIN — MORPHINE SULFATE PRN MG: 2 INJECTION, SOLUTION INTRAMUSCULAR; INTRAVENOUS at 21:20

## 2018-03-25 RX ADMIN — INSULIN ASPART SCH: 100 INJECTION, SOLUTION INTRAVENOUS; SUBCUTANEOUS at 12:32

## 2018-03-25 RX ADMIN — SODIUM CHLORIDE SCH MLS/HR: 900 INJECTION INTRAVENOUS at 09:16

## 2018-03-25 RX ADMIN — ACYCLOVIR SCH UNITS: 800 TABLET ORAL at 21:41

## 2018-03-25 RX ADMIN — INSULIN ASPART SCH: 100 INJECTION, SOLUTION INTRAVENOUS; SUBCUTANEOUS at 17:27

## 2018-03-25 RX ADMIN — INSULIN ASPART SCH: 100 INJECTION, SOLUTION INTRAVENOUS; SUBCUTANEOUS at 08:00

## 2018-03-25 RX ADMIN — Medication SCH ML: at 21:16

## 2018-03-25 RX ADMIN — SODIUM CHLORIDE SCH MLS/HR: 900 INJECTION INTRAVENOUS at 03:45

## 2018-03-25 RX ADMIN — SODIUM CHLORIDE SCH MLS/HR: 900 INJECTION INTRAVENOUS at 14:39

## 2018-03-25 RX ADMIN — SODIUM CHLORIDE SCH MLS/HR: 900 INJECTION INTRAVENOUS at 21:16

## 2018-03-25 RX ADMIN — INSULIN ASPART SCH: 100 INJECTION, SOLUTION INTRAVENOUS; SUBCUTANEOUS at 21:42

## 2018-03-25 RX ADMIN — ACYCLOVIR SCH UNITS: 800 TABLET ORAL at 09:18

## 2018-03-25 RX ADMIN — Medication SCH ML: at 09:17

## 2018-03-25 NOTE — HHI.PR
Subjective


Remarks


complains of mild pain


complains of stiffness


complaint with limb elevation


denies any numbness


no fever





Objective





Vital Signs








  Date Time  Temp Pulse Resp B/P (MAP) Pulse Ox O2 Delivery O2 Flow Rate FiO2


 


3/25/18 08:00 98.0 72 18 127/60 (82) 95   


 


3/25/18 03:15 98.0 73 17 142/79 (100) 94   


 


3/25/18 00:00 97.6 88 19 150/67 (94) 96   


 


3/24/18 20:15 98.8 85 17 164/74 (104) 95   


 


3/24/18 16:00 98.2 74 18 148/80 (102) 97   














I/O      


 


 3/24/18 3/24/18 3/24/18 3/25/18 3/25/18 3/25/18





 07:00 15:00 23:00 07:00 15:00 23:00


 


Intake Total 100 ml  900 ml 1000 ml  


 


Balance 100 ml  900 ml 1000 ml  


 


      


 


Intake Oral   900 ml 1000 ml  


 


IV Total 100 ml     


 


# Voids 1  2 3  


 


# Bowel Movements 0  0 0  











examination of left hand:





dressing in place


mild swelling and redness noted over the MP joint region


minimal drainage


terminal degrees of flexion and extension of the MP joint is limited and painful





cultures: MSSA


Result Diagram:  


3/22/18 0533                                                                   

             3/22/18 0533








Assessment and Plan


Assessment and Plan


53 year old female s/p I and D, arthrotomy Index finger MP joint POD 4





Plan:





suture removed, wound opened up and washed with normal saline


dry dressing applied


continue with limb elevation and range of motion exercises


continue IV antibiotics based on ID recommendations


hand surgery will follow.











Deshawn Delgadillo MD Mar 25, 2018 12:13

## 2018-03-25 NOTE — RADRPT
EXAM DATE/TIME:  03/25/2018 22:39 

 

[facility] COMPARISON:     

No previous studies available for comparison.

        

 

 

INDICATIONS :                

Right arm swelling. 

            

 

MEDICAL HISTORY :        

Headache. Migraine. Arthritis. Diabetes. 

 

SURGICAL HISTORY :     

Hysterectomy.    Left meniscus repair. 

 

ENCOUNTER:     

Initial

 

ACUITY:     

2 day

 

PAIN SCORE:      

4/10

 

LOCATION:      

Right  arm. 

                       

 

FINDINGS:     

There is spontaneous flow documented in the brachial, basilic, cephalic, axillary, and subclavian vei
ns.  The vessels are compressible and augmentation response is documented.  No filling defects are se
en.  The flow is phasic with respiration.  Direction of flow in the jugular vein is caudal.  

 

CONCLUSION:     

No DVT right arm.

 

 

 

 Shaan Zafar MD on March 25, 2018 at 23:16           

Board Certified Radiologist.

 This report was verified electronically.

## 2018-03-25 NOTE — HHI.PR
Subjective


Remarks


Follow up for dog bite wound, septic arthritis. Patient is currently doing 

well. Able to move her left fingers but has some pain. No fever, chills.





Objective


Vitals





Vital Signs








  Date Time  Temp Pulse Resp B/P (MAP) Pulse Ox O2 Delivery O2 Flow Rate FiO2


 


3/25/18 16:00 98.5 107 18 111/86 (94) 96   


 


3/25/18 08:00 98.0 72 18 127/60 (82) 95   


 


3/25/18 03:15 98.0 73 17 142/79 (100) 94   


 


3/25/18 00:00 97.6 88 19 150/67 (94) 96   














I/O      


 


 3/24/18 3/24/18 3/24/18 3/25/18 3/25/18 3/25/18





 07:00 15:00 23:00 07:00 15:00 23:00


 


Intake Total 100 ml  900 ml 1000 ml  


 


Balance 100 ml  900 ml 1000 ml  


 


      


 


Intake Oral   900 ml 1000 ml  


 


IV Total 100 ml     


 


# Voids 1  2 3  


 


# Bowel Movements 0  0 0 1 








Result Diagram:  


3/22/18 0533                                                                   

             3/22/18 0533





Objective Remarks


GENERAL: AOX3, NAD. 


SKIN: Warm and dry.


HEAD: Normocephalic.


EYES: No scleral icterus. No injection or drainage. 


NECK: Supple, trachea midline. No JVD or lymphadenopathy.


CARDIOVASCULAR: Regular rate and rhythm without murmurs, gallops, or rubs. 


RESPIRATORY: Breath sounds equal bilaterally. No accessory muscle use.


GASTROINTESTINAL: Abdomen soft, non-tender, nondistended. 


MUSCULOSKELETAL: No cyanosis, or edema. Left arm tied to pole to keep elevated. 

Able to move fingers. 


BACK: Nontender without obvious deformity. No CVA tenderness.





Procedures


3/21/2018


incision and drainage left hand abscess


arthrotomy metacarpophalangeal joint left index finger





A/P


Problem List:  


(1) Dog bite of left hand including fingers with infection


ICD Code:  S61.452A - Open bite of left hand, initial encounter; S61.259A - 

Open bite of unspecified finger without damage to nail, initial encounter; 

L08.9 - Local infection of the skin and subcutaneous tissue, unspecified; 

W54.0XXA - Bitten by dog, initial encounter


(2) Diabetes mellitus


ICD Code:  E11.9 - Type 2 diabetes mellitus without complications


Assessment and Plan


Ms. Woo is a 53 year old female who was admitted due to left hand pain, 

swelling following a dog bite. Her dog is vaccinated. 





- Dog bite wound of left hand


- Left hand abscess


   - Wound cx grew MSSA resistant to PCN. d/c Vancomycin. 


   - Currently on Unasyn. 


   - Infectious disease evaluated patient and continued Unasyn


   - Discussed with ID and hand surgery. Patient will likely need IV abx upon 

discharge - probably Ceftriaxone once a day. 





- Diabetes mellitus


   - HbA1c 9.7. Currently on Levemir 5 units Q12, Sliding scale insulin. 


   - Will initiate Metformin and Glimepiride upon discharge. Follow up HbA1C in 

3 month. 


   - Will also refer patient to [facility]ologist. 





Full code. SCDs, ambulation.











Rosetta Gardner DO Mar 25, 2018 21:23

## 2018-03-26 VITALS
OXYGEN SATURATION: 96 % | HEART RATE: 83 BPM | SYSTOLIC BLOOD PRESSURE: 119 MMHG | DIASTOLIC BLOOD PRESSURE: 64 MMHG | RESPIRATION RATE: 20 BRPM | TEMPERATURE: 98.5 F

## 2018-03-26 VITALS
TEMPERATURE: 98.5 F | HEART RATE: 74 BPM | RESPIRATION RATE: 20 BRPM | SYSTOLIC BLOOD PRESSURE: 153 MMHG | OXYGEN SATURATION: 95 % | DIASTOLIC BLOOD PRESSURE: 71 MMHG

## 2018-03-26 VITALS
OXYGEN SATURATION: 98 % | DIASTOLIC BLOOD PRESSURE: 66 MMHG | SYSTOLIC BLOOD PRESSURE: 130 MMHG | TEMPERATURE: 98 F | HEART RATE: 68 BPM | RESPIRATION RATE: 17 BRPM

## 2018-03-26 VITALS
TEMPERATURE: 97.6 F | SYSTOLIC BLOOD PRESSURE: 132 MMHG | RESPIRATION RATE: 19 BRPM | HEART RATE: 80 BPM | OXYGEN SATURATION: 97 % | DIASTOLIC BLOOD PRESSURE: 67 MMHG

## 2018-03-26 VITALS
RESPIRATION RATE: 20 BRPM | HEART RATE: 89 BPM | DIASTOLIC BLOOD PRESSURE: 63 MMHG | TEMPERATURE: 98.4 F | SYSTOLIC BLOOD PRESSURE: 136 MMHG | OXYGEN SATURATION: 94 %

## 2018-03-26 VITALS
SYSTOLIC BLOOD PRESSURE: 125 MMHG | OXYGEN SATURATION: 94 % | TEMPERATURE: 98.4 F | DIASTOLIC BLOOD PRESSURE: 73 MMHG | RESPIRATION RATE: 20 BRPM | HEART RATE: 85 BPM

## 2018-03-26 RX ADMIN — INSULIN ASPART SCH: 100 INJECTION, SOLUTION INTRAVENOUS; SUBCUTANEOUS at 18:25

## 2018-03-26 RX ADMIN — Medication SCH ML: at 08:39

## 2018-03-26 RX ADMIN — INSULIN ASPART SCH: 100 INJECTION, SOLUTION INTRAVENOUS; SUBCUTANEOUS at 08:39

## 2018-03-26 RX ADMIN — INSULIN ASPART SCH: 100 INJECTION, SOLUTION INTRAVENOUS; SUBCUTANEOUS at 12:26

## 2018-03-26 RX ADMIN — SODIUM CHLORIDE SCH MLS/HR: 900 INJECTION INTRAVENOUS at 08:39

## 2018-03-26 RX ADMIN — SODIUM CHLORIDE SCH MLS/HR: 900 INJECTION INTRAVENOUS at 03:37

## 2018-03-26 RX ADMIN — ACYCLOVIR SCH UNITS: 800 TABLET ORAL at 22:19

## 2018-03-26 RX ADMIN — SODIUM CHLORIDE SCH MLS/HR: 900 INJECTION INTRAVENOUS at 22:19

## 2018-03-26 RX ADMIN — ACYCLOVIR SCH UNITS: 800 TABLET ORAL at 08:39

## 2018-03-26 RX ADMIN — Medication SCH ML: at 22:19

## 2018-03-26 RX ADMIN — SODIUM CHLORIDE SCH MLS/HR: 900 INJECTION INTRAVENOUS at 15:23

## 2018-03-26 RX ADMIN — INSULIN ASPART SCH: 100 INJECTION, SOLUTION INTRAVENOUS; SUBCUTANEOUS at 22:20

## 2018-03-26 NOTE — HHI.IDPN
Subjective


Subjective


Remarks


doing well


no new issues


afebrile


improving locally


Antibiotics


unasyn


Allergies:  


Coded Allergies:  


     No Known Allergies (Verified  Allergy, Severe, 3/20/18)





Objective


.





Vital Signs








  Date Time  Temp Pulse Resp B/P (MAP) Pulse Ox O2 Delivery O2 Flow Rate FiO2


 


3/26/18 11:59 98.4 85 20 125/73 (90) 94   


 


3/26/18 08:11 98.5 74 20 153/71 (98) 95   


 


3/26/18 04:30 98.0 68 17 130/66 (87) 98   


 


3/26/18 00:30 97.6 80 19 132/67 (88) 97   


 


3/25/18 21:20 97.9 84 18 136/73 (94) 96   








Imaging





Last Impressions








Upper Extremity Ultrasound 3/25/18 0000 Signed





Impressions: 





 Service Date/Time:  Sunday, March 25, 2018 22:39 - CONCLUSION:  No DVT right 





 arm.     Shaan Zafar MD 








Physical Exam


CONSTITUTIONAL/GENERAL: This is an adequately nourished patient, in no apparent 

distress.


TUBES/LINES/DRAINS:


SKIN: No jaundice, rashes, or lesions.


Skin temperature appropriate. Not diaphoretic. 


 GASTROINTESTINAL: Abdomen soft, non-tender, nondistended. N 


 MUSCULOSKELETAL: Extremities without clubbing, cyanosis, or edema. No joint 

tenderness or effusion noted. No calf tenderness. No mottling or clubbing.


STATUS LOCALIS:


L hand with nearly resolved  small amount of barely noticible erythema  + 

wrinkling cw diminishing edema, minimal erythema


 NEUROLOGICAL: Awake and alert. Motor and sensory grossly within normal limits. 

Follows commands. Cognitively sharp. Moves all extremities.


PSYCHIATRIC: No obvious anxiety/depression. no apparent hallucinations or other 

psychotic thought process.





Assessment & Plan


Remarks


Sp dog bite with abscess, left hand/index finger and septic arthritis 

metacarpophalangeal joint, left index finger, MSSA


 sp I+D





cont Unasyn for now 


CFTX x2.5 weeks











Faiza Gómez MD Mar 26, 2018 16:37

## 2018-03-26 NOTE — HHI.PR
Subjective


Remarks


Follow up for dog bite wound, septic arthritis. Patient is doing well. No acute 

concerns. No fever, chills.





Objective


Vitals





Vital Signs








  Date Time  Temp Pulse Resp B/P (MAP) Pulse Ox O2 Delivery O2 Flow Rate FiO2


 


3/26/18 16:33 98.5 83 20 119/64 (82) 96   


 


3/26/18 11:59 98.4 85 20 125/73 (90) 94   


 


3/26/18 08:11 98.5 74 20 153/71 (98) 95   


 


3/26/18 04:30 98.0 68 17 130/66 (87) 98   


 


3/26/18 00:30 97.6 80 19 132/67 (88) 97   


 


3/25/18 21:20 97.9 84 18 136/73 (94) 96   














I/O      


 


 3/25/18 3/25/18 3/25/18 3/26/18 3/26/18 3/26/18





 07:00 15:00 23:00 07:00 15:00 23:00


 


Intake Total 1000 ml  650 ml 850 ml  960 ml


 


Balance 1000 ml  650 ml 850 ml  960 ml


 


      


 


Intake Oral 1000 ml  550 ml 750 ml  960 ml


 


IV Total   100 ml 100 ml  


 


# Voids 3  0 2  2


 


# Bowel Movements 0 1 0 0  2








Result Diagram:  


3/22/18 0533                                                                   

             3/22/18 0533





Objective Remarks


GENERAL: AOX3, NAD. 


SKIN: Warm and dry.


HEAD: Normocephalic.


EYES: No scleral icterus. No injection or drainage. 


NECK: Supple, trachea midline. No JVD or lymphadenopathy.


CARDIOVASCULAR: Regular rate and rhythm without murmurs, gallops, or rubs. 


RESPIRATORY: Breath sounds equal bilaterally. No accessory muscle use.


GASTROINTESTINAL: Abdomen soft, non-tender, nondistended. 


MUSCULOSKELETAL: No cyanosis, or edema. Left arm tied to pole to keep elevated. 

Able to move fingers. 


BACK: Nontender without obvious deformity. No CVA tenderness.





Procedures


3/21/2018


incision and drainage left hand abscess


arthrotomy metacarpophalangeal joint left index finger





A/P


Problem List:  


(1) Dog bite of left hand including fingers with infection


ICD Code:  S61.452A - Open bite of left hand, initial encounter; S61.259A - 

Open bite of unspecified finger without damage to nail, initial encounter; 

L08.9 - Local infection of the skin and subcutaneous tissue, unspecified; 

W54.0XXA - Bitten by dog, initial encounter


(2) Diabetes mellitus


ICD Code:  E11.9 - Type 2 diabetes mellitus without complications


Assessment and Plan


Ms. Woo is a 53 year old female who was admitted due to left hand pain, 

swelling following a dog bite. Her dog is vaccinated. 





- Dog bite wound of left hand


- Left hand abscess


   - Wound cx grew MSSA resistant to PCN. d/c Vancomycin. 


   - Currently on Unasyn. 


   - Infectious disease evaluated patient and continued Unasyn


   - Discussed with ID and hand surgery. Likely discharge on 3/27/2018 after 

midline placed. 





- Diabetes mellitus


   - HbA1c 9.7. Currently on Levemir 5 units Q12, Sliding scale insulin. 


   - Will initiate Metformin and Glimepiride upon discharge. Follow up HbA1C in 

3 month. 


   - Will also refer patient to [facility]. 





Full code. SCDs, ambulation.











Rosetta Gardner DO Mar 26, 2018 19:18

## 2018-03-26 NOTE — HHI.FF
__________________________________________________





Infusion Therapy


Location of Infusion Therapy:  [facility]fusion [facility]


Patient Information


Patient Weight


84 kg


Diagnosis:  


Coded Allergies:  


     No Known Allergies (Verified  Allergy, Severe, 3/20/18)





Administer Medication


Ceftriaxone


2 grams IV


q 24 hours


Start Treatment:  Mar 27, 2018


Stop Treatment:  Apr 11, 2018





Additional Information


Venous access:  Other (midline)


Additional Instructions





[x] Peripheral flush and dressing changes per protocol


[x] Implanted port and central :


* Implanted port: 10 ml Normal Saline followed by 5 ml Heparin 100 units/ml 

Heparin flush


   after each use and monthly to maintain.


   [] May leave port accessed during therapy.


   [] May leave peripheral site accessed for duration of therapy.





[x] If patient has SOB or respiratory distress, check oxygen saturation. If 

less than 90% or clinical signs of respiratory distress, administer oxygen at 2 

L/min. via nasal cannula and notify physician.





[x] Anaphylaxis/Reaction orders:


* Stop infusion.


* Keep IV line open with saline flush.


* Notify physician.


* Monitor vital signs every 15 minutes until symptoms resolve.


* Check Oxygen saturation; Oxygen at 2 L/min. via nasal cannula if less than 90%

 or clinical signs of respiratory distress.


* Administer diphenhydramine (Benadryl) 25 mg IV STAT, (unless patient has 

received as pre-med). May repeat once, if necessary.


* Solu-Cortef 250 mg IVP over 30-60 seconds, use 100 mg vials for each 

dissolution.


* Epinephrine (1mg/1 ml) 0.3 mg subcutaneously or IVP now with any signs of 

respiratory distress.


* Check with physician for new additional pre-med orders if patient is re-

challenged or re-treated.


[x] May remove PICC line when treatment complete, after confirming with 

Physician.


[x] If the patient is admitted to the hospital, the ED, or transferred via [facility]

, complete transfer form including medication reconciliation order sheet.





Laboratory Tests


Weekly Labs:  CBC w/diff, Creatinine, LFT's (Hepatic function test)











Faiza Gómez MD Mar 26, 2018 16:39

## 2018-03-27 VITALS
HEART RATE: 71 BPM | DIASTOLIC BLOOD PRESSURE: 61 MMHG | SYSTOLIC BLOOD PRESSURE: 130 MMHG | RESPIRATION RATE: 18 BRPM | TEMPERATURE: 98.3 F | OXYGEN SATURATION: 96 %

## 2018-03-27 VITALS
OXYGEN SATURATION: 95 % | SYSTOLIC BLOOD PRESSURE: 132 MMHG | HEART RATE: 80 BPM | TEMPERATURE: 98.1 F | RESPIRATION RATE: 18 BRPM | DIASTOLIC BLOOD PRESSURE: 72 MMHG

## 2018-03-27 VITALS
OXYGEN SATURATION: 96 % | DIASTOLIC BLOOD PRESSURE: 80 MMHG | TEMPERATURE: 97.8 F | SYSTOLIC BLOOD PRESSURE: 142 MMHG | RESPIRATION RATE: 18 BRPM | HEART RATE: 73 BPM

## 2018-03-27 LAB
ALBUMIN SERPL-MCNC: 3 GM/DL (ref 3.4–5)
ALP SERPL-CCNC: 56 U/L (ref 45–117)
ALT SERPL-CCNC: 20 U/L (ref 10–53)
AST SERPL-CCNC: 11 U/L (ref 15–37)
BASOPHILS # BLD AUTO: 0.1 TH/MM3 (ref 0–0.2)
BASOPHILS NFR BLD: 1.1 % (ref 0–2)
BILIRUB SERPL-MCNC: 0.7 MG/DL (ref 0.2–1)
BUN SERPL-MCNC: 13 MG/DL (ref 7–18)
CALCIUM SERPL-MCNC: 9 MG/DL (ref 8.5–10.1)
CHLORIDE SERPL-SCNC: 103 MEQ/L (ref 98–107)
CREAT SERPL-MCNC: 0.94 MG/DL (ref 0.5–1)
EOSINOPHIL # BLD: 0.5 TH/MM3 (ref 0–0.4)
EOSINOPHIL NFR BLD: 5.7 % (ref 0–4)
ERYTHROCYTE [DISTWIDTH] IN BLOOD BY AUTOMATED COUNT: 12.2 % (ref 11.6–17.2)
GFR SERPLBLD BASED ON 1.73 SQ M-ARVRAT: 62 ML/MIN (ref 89–?)
GLUCOSE SERPL-MCNC: 252 MG/DL (ref 74–106)
HCO3 BLD-SCNC: 28.9 MEQ/L (ref 21–32)
HCT VFR BLD CALC: 40.4 % (ref 35–46)
HGB BLD-MCNC: 13.9 GM/DL (ref 11.6–15.3)
LYMPHOCYTES # BLD AUTO: 2.3 TH/MM3 (ref 1–4.8)
LYMPHOCYTES NFR BLD AUTO: 24.5 % (ref 9–44)
MCH RBC QN AUTO: 29.9 PG (ref 27–34)
MCHC RBC AUTO-ENTMCNC: 34.5 % (ref 32–36)
MCV RBC AUTO: 86.9 FL (ref 80–100)
MONOCYTE #: 0.5 TH/MM3 (ref 0–0.9)
MONOCYTES NFR BLD: 5.7 % (ref 0–8)
NEUTROPHILS # BLD AUTO: 5.8 TH/MM3 (ref 1.8–7.7)
NEUTROPHILS NFR BLD AUTO: 63 % (ref 16–70)
PLATELET # BLD: 314 TH/MM3 (ref 150–450)
PMV BLD AUTO: 8.1 FL (ref 7–11)
PROT SERPL-MCNC: 7.3 GM/DL (ref 6.4–8.2)
RBC # BLD AUTO: 4.65 MIL/MM3 (ref 4–5.3)
SODIUM SERPL-SCNC: 139 MEQ/L (ref 136–145)
WBC # BLD AUTO: 9.2 TH/MM3 (ref 4–11)

## 2018-03-27 RX ADMIN — SODIUM CHLORIDE SCH MLS/HR: 900 INJECTION INTRAVENOUS at 03:00

## 2018-03-27 RX ADMIN — SODIUM CHLORIDE SCH MLS/HR: 900 INJECTION INTRAVENOUS at 09:21

## 2018-03-27 RX ADMIN — INSULIN ASPART SCH: 100 INJECTION, SOLUTION INTRAVENOUS; SUBCUTANEOUS at 08:00

## 2018-03-27 RX ADMIN — ACYCLOVIR SCH UNITS: 800 TABLET ORAL at 09:00

## 2018-03-27 RX ADMIN — Medication SCH ML: at 09:00

## 2018-03-28 NOTE — HHI.DS
__________________________________________________





Discharge Summary


Admission Date


Mar 21, 2018 at 01:13


Discharge Date:  Mar 27, 2018


Admitting Diagnosis








(1) Dog bite of left hand including fingers with infection


ICD Code:  S61.452A - Open bite of left hand, initial encounter; S61.259A - 

Open bite of unspecified finger without damage to nail, initial encounter; 

L08.9 - Local infection of the skin and subcutaneous tissue, unspecified; 

W54.0XXA - Bitten by dog, initial encounter


(2) Diabetes mellitus


ICD Code:  E11.9 - Type 2 diabetes mellitus without complications


Procedures


3/21/2018


incision and drainage left hand abscess


arthrotomy metacarpophalangeal joint left index finger


Brief History - From Admission


53-year-old female with no significant past medical history who presents 

overnight with 3 day history of constant sharp pain in the left and radiating 

to her fingers.  She was bitten by her dog 3 days ago.  She denies any chest 

pain, shortness of breath, lightheadedness, dizziness.


CBC/BMP:  


3/27/18 1043                                                                   

             3/27/18 1043





Significant Findings





Laboratory Tests








Test


  3/27/18


10:43


 


Eosinophils (%) (Auto)


  5.7 %


(0.0-4.0)


 


Eosinophils # (Auto)


  0.5 TH/MM3


(0-0.4)


 


Random Glucose


  252 MG/DL


()


 


Albumin


  3.0 GM/DL


(3.4-5.0)


 


Aspartate Amino Transf


(AST/SGOT) 11 U/L (15-37) 


 


 


Estimat Glomerular Filtration


Rate 62 ML/MIN


(>89)








Imaging





Last Impressions








Upper Extremity Ultrasound 3/25/18 0000 Signed





Impressions: 





 Service Date/Time:  Sunday, March 25, 2018 22:39 - CONCLUSION:  No DVT right 





 arm.     Shaan Zafar MD 








PE at Discharge


GENERAL: AOX3, NAD. 


SKIN: Warm and dry.


HEAD: Normocephalic.


EYES: No scleral icterus. No injection or drainage. 


NECK: Supple, trachea midline. No JVD or lymphadenopathy.


CARDIOVASCULAR: Regular rate and rhythm without murmurs, gallops, or rubs. 


RESPIRATORY: Breath sounds equal bilaterally. No accessory muscle use.


GASTROINTESTINAL: Abdomen soft, non-tender, nondistended. 


MUSCULOSKELETAL: No cyanosis, or edema. Left arm tied to pole to keep elevated. 

Able to move fingers. 


BACK: Nontender without obvious deformity. No CVA tenderness.





Pt update on day of discharge


Patient is doing well. No fever, chills. Wants to go home.


Hospital Course


Ms. Woo is a 53 year old female who was admitted due to left hand pain, 

swelling following a dog bite. Her dog is vaccinated. 





- Sepsis (patient met sepsis criteria by Heart rate > 90 and RR > 20 along with 

known infection). Resolved. No organ damage. 


- Dog bite wound of left hand


- Left hand abscess


   - Wound cx grew MSSA resistant to PCN. d/c Vancomycin. 


   - Patient was on unasyn, ID recommended Ceftriaxone upon discharge. 


   - Discussed with ID and hand surgery - okay to d/c patient. 





- Diabetes mellitus


   - HbA1c 9.7. Currently on Levemir 5 units Q12, Sliding scale insulin. 


   - Will initiate Metformin and Glimepiride upon discharge. Follow up HbA1C in 

3 month. 


   - Will also refer patient to [facility]. 





Full code. SCDs, ambulation.


Pt Condition on Discharge:  Good


Discharge Disposition:  Disch w/ Home Health Serv


Discharge Time:  > 30 minutes


Discharge Instructions


DIET: Follow Instructions for:  Diabetic Diet


Activities you can perform:  Regular-No Restrictions


Follow up Referrals:  


Appointment for Follow Up


Endocrinology - 2 Weeks with Ronald Winston MD


Hand Surgery


PCP Follow-up - 1 Week


PCP Follow-up





New Medications:  


Ceftriaxone Inj (Ceftriaxone Inj) 2 Gram Inj


2 GM IM Q24H for Infection for 18 Days, VIAL 0 Refills





Epinephrine Inj (Epinephrine Inj) 1 Mg/Ml (1 Ml) Inj


0.3 MG IV PUSH ONCE PRN for ALLERGIC REACTION, #1 VIAL





Epinephrine Inj (Epinephrine Inj) 1 Mg/Ml (1 Ml) Inj


0.3 MG SQ ONCE PRN for ALLERGIC REACTION, #1 VIAL


Give with any signs of respiratory distress.


Glimepiride (Glimepiride) 2 Mg Tab


2 MG PO DAILY for Blood Sugar Management, #30 TAB 11 Refills


Take with breakfast or first main meal


Hydrocortisone Inj (Solu-Cortef Inj) 250 Mg/2 Ml Inj


250 MG IV PUSH ONCE PRN for ALLERGIC REACTION, #1 VIAL 0 Refills


Give over 30-60 seconds.


Metformin ER (Metformin ER) 500 Mg Neeru


500 MG PO BID for Blood Sugar Management, #60 TAB 5 Refills


With evening meal


Tramadol (Tramadol) 50 Mg Tab


50 MG PO Q8H PRN for PAIN, #15 TAB 0 Refills

















Rosetta Gardner DO Mar 28, 2018 00:04